# Patient Record
Sex: FEMALE | Race: WHITE | NOT HISPANIC OR LATINO | URBAN - METROPOLITAN AREA
[De-identification: names, ages, dates, MRNs, and addresses within clinical notes are randomized per-mention and may not be internally consistent; named-entity substitution may affect disease eponyms.]

---

## 2017-07-11 ENCOUNTER — EMERGENCY (EMERGENCY)
Facility: HOSPITAL | Age: 22
LOS: 1 days | Discharge: HOME | End: 2017-07-11
Attending: EMERGENCY MEDICINE | Admitting: INTERNAL MEDICINE
Payer: COMMERCIAL

## 2017-07-11 VITALS
DIASTOLIC BLOOD PRESSURE: 71 MMHG | HEIGHT: 63 IN | HEART RATE: 100 BPM | TEMPERATURE: 98 F | SYSTOLIC BLOOD PRESSURE: 106 MMHG | WEIGHT: 175.05 LBS | RESPIRATION RATE: 16 BRPM

## 2017-07-11 PROCEDURE — 99284 EMERGENCY DEPT VISIT MOD MDM: CPT

## 2017-07-11 RX ORDER — OXYCODONE AND ACETAMINOPHEN 5; 325 MG/1; MG/1
1 TABLET ORAL ONCE
Qty: 0 | Refills: 0 | Status: DISCONTINUED | OUTPATIENT
Start: 2017-07-11 | End: 2017-07-11

## 2017-07-11 RX ADMIN — OXYCODONE AND ACETAMINOPHEN 1 TABLET(S): 5; 325 TABLET ORAL at 19:49

## 2017-07-11 RX ADMIN — OXYCODONE AND ACETAMINOPHEN 1 TABLET(S): 5; 325 TABLET ORAL at 20:04

## 2017-07-11 RX ADMIN — Medication 300 MILLIGRAM(S): at 19:56

## 2017-07-24 ENCOUNTER — EMERGENCY (EMERGENCY)
Facility: HOSPITAL | Age: 22
LOS: 1 days | Discharge: HOME | End: 2017-07-24
Attending: EMERGENCY MEDICINE | Admitting: INTERNAL MEDICINE
Payer: COMMERCIAL

## 2017-07-24 VITALS
SYSTOLIC BLOOD PRESSURE: 110 MMHG | HEART RATE: 114 BPM | OXYGEN SATURATION: 97 % | TEMPERATURE: 99 F | HEIGHT: 63 IN | RESPIRATION RATE: 18 BRPM | DIASTOLIC BLOOD PRESSURE: 74 MMHG | WEIGHT: 175.05 LBS

## 2017-07-24 PROCEDURE — 99283 EMERGENCY DEPT VISIT LOW MDM: CPT

## 2017-07-24 RX ORDER — ALPRAZOLAM 0.25 MG
1 TABLET ORAL
Qty: 9 | Refills: 0 | OUTPATIENT
Start: 2017-07-24 | End: 2017-07-27

## 2017-07-24 RX ORDER — ALPRAZOLAM 0.25 MG
1 TABLET ORAL ONCE
Qty: 0 | Refills: 0 | Status: DISCONTINUED | OUTPATIENT
Start: 2017-07-24 | End: 2017-07-24

## 2017-07-24 RX ADMIN — Medication 1 MILLIGRAM(S): at 20:33

## 2017-07-24 NOTE — ED ADULT NURSE NOTE - OBJECTIVE STATEMENT
23YO female walked into ED, pt c/o "im having anxiety and some chest discomfort". pt denies any chest pain. pt indicates it all started since we became homeless.

## 2017-07-24 NOTE — ED ADULT TRIAGE NOTE - CHIEF COMPLAINT QUOTE
"My chest is hurting" X 2 days, had a rapid heartrate 2 days ago and was treated in another ER, ran out of Xanax, just came here from [place]

## 2017-07-24 NOTE — ED PROVIDER NOTE - OBJECTIVE STATEMENT
23 y/o F pt w/ PMHx of depression presents to the ED c/o chest discomfort x 4 days. Pt believes that the chest discomfort is anxiety induced, she has had episodes like this before... this symptom is typical for her anxiety exacerbations. Pt takes xanax and states that she ran out last night, which is when her symptoms began. Pt denies N/V or any other complaints at this time. 21 y/o F pt w/ PMHx of anxiety/depression presents to the ED c/o chest discomfort x 4 days. Pt believes that the chest discomfort is anxiety induced, she has had episodes like this before... this symptom is typical for her anxiety exacerbations. also c/o sob. Pt takes xanax and states that she ran out last night, which is when her symptoms began. pt recently moved here from [place], says very stressed, has not found a doctor yet. no si/hi/hallucinations. was seen at [place] yesterday - was given rx for xanax 0.25 and pt states was told to take 4 at a time to make up her dose, which gave her less than enough for 1 day. Pt says she was not given any numbers for psych referral. States did try today to make an appointment, got 1 a month from now. Pt denies N/V or any other complaints at this time.

## 2017-07-24 NOTE — ED PROVIDER NOTE - MEDICAL DECISION MAKING DETAILS
22 y.o. F with anxiety, recently moved, doesn't have regular doctor yet, takes xanax 1mg TID, having anxiety symptoms (CP, SOB), needs meds and referral

## 2017-07-26 ENCOUNTER — OUTPATIENT (OUTPATIENT)
Dept: OUTPATIENT SERVICES | Facility: HOSPITAL | Age: 22
LOS: 1 days | Discharge: STILL ADMITTED OR ADMINISTRATIVE | End: 2017-07-26

## 2017-07-27 DIAGNOSIS — F41.9 ANXIETY DISORDER, UNSPECIFIED: ICD-10-CM

## 2017-08-08 ENCOUNTER — EMERGENCY (EMERGENCY)
Facility: HOSPITAL | Age: 22
LOS: 1 days | Discharge: HOME | End: 2017-08-08
Attending: EMERGENCY MEDICINE | Admitting: EMERGENCY MEDICINE
Payer: COMMERCIAL

## 2017-08-08 VITALS
TEMPERATURE: 98 F | HEART RATE: 104 BPM | RESPIRATION RATE: 14 BRPM | HEIGHT: 63 IN | WEIGHT: 179.02 LBS | SYSTOLIC BLOOD PRESSURE: 95 MMHG | DIASTOLIC BLOOD PRESSURE: 61 MMHG | OXYGEN SATURATION: 95 %

## 2017-08-08 DIAGNOSIS — G43.909 MIGRAINE, UNSPECIFIED, NOT INTRACTABLE, WITHOUT STATUS MIGRAINOSUS: ICD-10-CM

## 2017-08-08 DIAGNOSIS — Z98.891 HISTORY OF UTERINE SCAR FROM PREVIOUS SURGERY: Chronic | ICD-10-CM

## 2017-08-08 DIAGNOSIS — F41.8 OTHER SPECIFIED ANXIETY DISORDERS: ICD-10-CM

## 2017-08-08 DIAGNOSIS — F17.210 NICOTINE DEPENDENCE, CIGARETTES, UNCOMPLICATED: ICD-10-CM

## 2017-08-08 DIAGNOSIS — R51 HEADACHE: ICD-10-CM

## 2017-08-08 DIAGNOSIS — Z88.0 ALLERGY STATUS TO PENICILLIN: ICD-10-CM

## 2017-08-08 DIAGNOSIS — R11.2 NAUSEA WITH VOMITING, UNSPECIFIED: ICD-10-CM

## 2017-08-08 LAB — HCG SERPL-ACNC: <1 MIU/ML — SIGNIFICANT CHANGE UP

## 2017-08-08 PROCEDURE — 99284 EMERGENCY DEPT VISIT MOD MDM: CPT

## 2017-08-08 RX ORDER — DIPHENHYDRAMINE HCL 50 MG
50 CAPSULE ORAL ONCE
Qty: 0 | Refills: 0 | Status: COMPLETED | OUTPATIENT
Start: 2017-08-08 | End: 2017-08-08

## 2017-08-08 RX ORDER — SODIUM CHLORIDE 9 MG/ML
1000 INJECTION INTRAMUSCULAR; INTRAVENOUS; SUBCUTANEOUS ONCE
Qty: 0 | Refills: 0 | Status: COMPLETED | OUTPATIENT
Start: 2017-08-08 | End: 2017-08-08

## 2017-08-08 RX ORDER — METOCLOPRAMIDE HCL 10 MG
10 TABLET ORAL ONCE
Qty: 0 | Refills: 0 | Status: COMPLETED | OUTPATIENT
Start: 2017-08-08 | End: 2017-08-08

## 2017-08-08 RX ORDER — SODIUM CHLORIDE 9 MG/ML
3 INJECTION INTRAMUSCULAR; INTRAVENOUS; SUBCUTANEOUS ONCE
Qty: 0 | Refills: 0 | Status: COMPLETED | OUTPATIENT
Start: 2017-08-08 | End: 2017-08-08

## 2017-08-08 RX ORDER — MAGNESIUM SULFATE 500 MG/ML
1 VIAL (ML) INJECTION ONCE
Qty: 0 | Refills: 0 | Status: COMPLETED | OUTPATIENT
Start: 2017-08-08 | End: 2017-08-08

## 2017-08-08 RX ADMIN — SODIUM CHLORIDE 3 MILLILITER(S): 9 INJECTION INTRAMUSCULAR; INTRAVENOUS; SUBCUTANEOUS at 21:38

## 2017-08-08 RX ADMIN — Medication 125 MILLIGRAM(S): at 22:56

## 2017-08-08 RX ADMIN — Medication 100 GRAM(S): at 21:32

## 2017-08-08 RX ADMIN — Medication 50 MILLIGRAM(S): at 21:31

## 2017-08-08 RX ADMIN — Medication 10 MILLIGRAM(S): at 21:32

## 2017-08-08 RX ADMIN — SODIUM CHLORIDE 1000 MILLILITER(S): 9 INJECTION INTRAMUSCULAR; INTRAVENOUS; SUBCUTANEOUS at 21:31

## 2017-08-08 NOTE — ED PROVIDER NOTE - CHPI ED SYMPTOMS NEG
no dizziness/no change in level of consciousness/no weakness/no numbness/no blurred vision/no fever/no confusion/no loss of consciousness

## 2017-08-08 NOTE — ED ADULT NURSE NOTE - CHPI ED SYMPTOMS NEG
no fever/no dizziness/no change in level of consciousness/no blurred vision/no numbness/no loss of consciousness

## 2017-08-08 NOTE — ED PROVIDER NOTE - PROGRESS NOTE DETAILS
pt reports resolution of headache at this time with no further symptoms.  Stable for discharge home at this time

## 2017-08-08 NOTE — ED ADULT NURSE NOTE - OBJECTIVE STATEMENT
Pt came to ED c/o headache for 5 days. Patient states she has a hx of headaches and is normally controlled on medication, but this time it was not helping. photosensitivity and nausea with two episiodes of vomitting noted. No visual disturbances, CP/SOB, abd pain, or fevers. VSS.

## 2017-08-08 NOTE — ED PROVIDER NOTE - PLAN OF CARE
Return to the ED for any new or worsening symptoms  Take your medication as prescribed previously  Advance activity as tolerated  Establish care with a PMD call the number provided tomorrow to schedule an appointment

## 2017-08-08 NOTE — ED PROVIDER NOTE - OBJECTIVE STATEMENT
Pt is a 21 yo female who presents to the ED with a cc of headache.  PMHx of migraines, depressions, anxiety.  Pt reports that for the last 5 days she has been experiencing a frontal HA.  This is similar to her previous headaches but her normal medication has not helped the symptoms.  She has tried Fioricet without relief of symptoms.  Reports associated phonophobia, photophobia, nausea, and 2 episodes of vomiting.  Denies fever, chills, D/C, neck pain, visual changes, CP, SOB, abd pain, ext numbness or weakness.  LMP 1 1/2 weeks ago.

## 2017-08-08 NOTE — ED PROVIDER NOTE - CARE PLAN
Principal Discharge DX:	Migraine  Instructions for follow-up, activity and diet:	Return to the ED for any new or worsening symptoms  Take your medication as prescribed previously  Advance activity as tolerated  Establish care with a PMD call the number provided tomorrow to schedule an appointment  Secondary Diagnosis:	Nausea & vomiting

## 2017-08-09 VITALS
SYSTOLIC BLOOD PRESSURE: 110 MMHG | RESPIRATION RATE: 14 BRPM | OXYGEN SATURATION: 98 % | TEMPERATURE: 98 F | DIASTOLIC BLOOD PRESSURE: 70 MMHG | HEART RATE: 89 BPM

## 2017-08-09 PROCEDURE — 96366 THER/PROPH/DIAG IV INF ADDON: CPT

## 2017-08-09 PROCEDURE — 99284 EMERGENCY DEPT VISIT MOD MDM: CPT | Mod: 25

## 2017-08-09 PROCEDURE — 36415 COLL VENOUS BLD VENIPUNCTURE: CPT

## 2017-08-09 PROCEDURE — 84702 CHORIONIC GONADOTROPIN TEST: CPT

## 2017-08-09 PROCEDURE — 96375 TX/PRO/DX INJ NEW DRUG ADDON: CPT

## 2017-08-09 PROCEDURE — 96365 THER/PROPH/DIAG IV INF INIT: CPT

## 2017-08-09 RX ORDER — MAGNESIUM SULFATE 500 MG/ML
1 VIAL (ML) INJECTION ONCE
Qty: 0 | Refills: 0 | Status: COMPLETED | OUTPATIENT
Start: 2017-08-09 | End: 2017-08-09

## 2017-08-09 RX ADMIN — Medication 100 GRAM(S): at 00:51

## 2017-08-31 ENCOUNTER — EMERGENCY (EMERGENCY)
Facility: HOSPITAL | Age: 22
LOS: 1 days | Discharge: HOME | End: 2017-08-31
Attending: EMERGENCY MEDICINE | Admitting: EMERGENCY MEDICINE
Payer: COMMERCIAL

## 2017-08-31 VITALS
TEMPERATURE: 98 F | HEART RATE: 110 BPM | OXYGEN SATURATION: 100 % | SYSTOLIC BLOOD PRESSURE: 111 MMHG | RESPIRATION RATE: 16 BRPM | WEIGHT: 179.9 LBS | DIASTOLIC BLOOD PRESSURE: 78 MMHG | HEIGHT: 62 IN

## 2017-08-31 DIAGNOSIS — K08.89 OTHER SPECIFIED DISORDERS OF TEETH AND SUPPORTING STRUCTURES: ICD-10-CM

## 2017-08-31 DIAGNOSIS — Z88.5 ALLERGY STATUS TO NARCOTIC AGENT: ICD-10-CM

## 2017-08-31 DIAGNOSIS — Z88.0 ALLERGY STATUS TO PENICILLIN: ICD-10-CM

## 2017-08-31 DIAGNOSIS — Z98.891 HISTORY OF UTERINE SCAR FROM PREVIOUS SURGERY: Chronic | ICD-10-CM

## 2017-08-31 PROCEDURE — 99283 EMERGENCY DEPT VISIT LOW MDM: CPT

## 2017-08-31 RX ORDER — CITALOPRAM 10 MG/1
1 TABLET, FILM COATED ORAL
Qty: 0 | Refills: 0 | COMMUNITY

## 2017-09-02 ENCOUNTER — EMERGENCY (EMERGENCY)
Facility: HOSPITAL | Age: 22
LOS: 1 days | Discharge: HOME | End: 2017-09-02
Attending: EMERGENCY MEDICINE | Admitting: EMERGENCY MEDICINE
Payer: COMMERCIAL

## 2017-09-02 VITALS
TEMPERATURE: 98 F | HEART RATE: 100 BPM | SYSTOLIC BLOOD PRESSURE: 107 MMHG | RESPIRATION RATE: 14 BRPM | WEIGHT: 179.9 LBS | DIASTOLIC BLOOD PRESSURE: 70 MMHG | OXYGEN SATURATION: 99 %

## 2017-09-02 DIAGNOSIS — Z88.5 ALLERGY STATUS TO NARCOTIC AGENT: ICD-10-CM

## 2017-09-02 DIAGNOSIS — F41.8 OTHER SPECIFIED ANXIETY DISORDERS: ICD-10-CM

## 2017-09-02 DIAGNOSIS — F17.200 NICOTINE DEPENDENCE, UNSPECIFIED, UNCOMPLICATED: ICD-10-CM

## 2017-09-02 DIAGNOSIS — Z98.891 HISTORY OF UTERINE SCAR FROM PREVIOUS SURGERY: Chronic | ICD-10-CM

## 2017-09-02 DIAGNOSIS — Z88.0 ALLERGY STATUS TO PENICILLIN: ICD-10-CM

## 2017-09-02 DIAGNOSIS — K08.89 OTHER SPECIFIED DISORDERS OF TEETH AND SUPPORTING STRUCTURES: ICD-10-CM

## 2017-09-02 PROCEDURE — 99283 EMERGENCY DEPT VISIT LOW MDM: CPT

## 2017-09-02 PROCEDURE — 99282 EMERGENCY DEPT VISIT SF MDM: CPT

## 2017-09-02 NOTE — ED ADULT NURSE NOTE - OBJECTIVE STATEMENT
Patient c/o left lower tooth pain in the molar area.  She was seen here for the same yesterday, and was given a scrip for percocet.  She did not see a dentist today.

## 2017-09-02 NOTE — ED PROVIDER NOTE - PROGRESS NOTE DETAILS
offered ibuprofen - patient refused   offered antibiotics - patient refused  discussed importance of follow up with dentist

## 2017-09-02 NOTE — ED PROVIDER NOTE - OBJECTIVE STATEMENT
23 yo female c/o toothache. Patient was seen in ED x 2 days ago, and states that percocet doesn't help. took motrin earlier today. denies any fever or chills. denies any difficulty swallowing.

## 2017-09-04 ENCOUNTER — EMERGENCY (EMERGENCY)
Facility: HOSPITAL | Age: 22
LOS: 1 days | Discharge: HOME | End: 2017-09-04
Attending: EMERGENCY MEDICINE | Admitting: EMERGENCY MEDICINE
Payer: COMMERCIAL

## 2017-09-04 VITALS
OXYGEN SATURATION: 99 % | DIASTOLIC BLOOD PRESSURE: 60 MMHG | SYSTOLIC BLOOD PRESSURE: 100 MMHG | RESPIRATION RATE: 16 BRPM | TEMPERATURE: 98 F | HEART RATE: 95 BPM

## 2017-09-04 VITALS
TEMPERATURE: 98 F | SYSTOLIC BLOOD PRESSURE: 119 MMHG | OXYGEN SATURATION: 100 % | DIASTOLIC BLOOD PRESSURE: 80 MMHG | HEIGHT: 63 IN | WEIGHT: 179.9 LBS | RESPIRATION RATE: 16 BRPM | HEART RATE: 103 BPM

## 2017-09-04 DIAGNOSIS — Z98.891 HISTORY OF UTERINE SCAR FROM PREVIOUS SURGERY: Chronic | ICD-10-CM

## 2017-09-04 PROCEDURE — 99283 EMERGENCY DEPT VISIT LOW MDM: CPT

## 2017-09-04 RX ORDER — PANTOPRAZOLE SODIUM 20 MG/1
40 TABLET, DELAYED RELEASE ORAL ONCE
Qty: 0 | Refills: 0 | Status: COMPLETED | OUTPATIENT
Start: 2017-09-04 | End: 2017-09-04

## 2017-09-04 RX ADMIN — Medication 30 MILLILITER(S): at 21:03

## 2017-09-04 RX ADMIN — PANTOPRAZOLE SODIUM 40 MILLIGRAM(S): 20 TABLET, DELAYED RELEASE ORAL at 20:59

## 2017-09-04 NOTE — ED ADULT NURSE REASSESSMENT NOTE - NS ED NURSE REASSESS COMMENT FT1
patient DC home awake, alert oriented ambulatory stated she felt better; VS WNL; dc instructions given; verbalized understanding.

## 2017-09-04 NOTE — ED PROVIDER NOTE - CHPI ED SYMPTOMS NEG
no diarrhea/no fever/no hematuria/no dysuria/no chills/no vomiting/no nausea/no blood in stool/no burning urination/no abdominal distension

## 2017-09-04 NOTE — ED PROVIDER NOTE - OBJECTIVE STATEMENT
Pt is a 21 yo f who has had multiple visits to this and other emergency departments for dental pain and migrane ha.  she reports an allergy to toradol but has been taking naproxen for the dental pain because the er doctor told her "take it every time you have pain" rather than take it as prescribed on the pill bottle she had been taking every 2-4 hours in past 24 hours and states she now has abdominal pain in her stomach. no n v no fevr  no chlls. she denies any other complaint.  she states she cant get to an er with a dental chair because she has to pay for transport and lives 5 min away from this hospital by car where she relies on friends to take her. (but could not explain why they would not take her the extra few minutes for definitive care) she also states she has a dentist appointment scheduled on next monday (9/11) but was uncertain of the date.  Pt states as an infant she had ureter surgery. she is currently taking amoxil but states she is allergic to pcn. taking naproxen but has allergy to toradol?

## 2017-09-04 NOTE — ED PROVIDER NOTE - CONSTITUTIONAL, MLM
normal... Well appearing, well nourished, awake, alert, oriented to person, place, time/situation and in no apparent distress. sitting on exam bed no distress, comfortable watching tv

## 2017-09-04 NOTE — ED PROVIDER NOTE - GASTROINTESTINAL, MLM
Abdomen soft, non-tender, no guarding. no rebound no cvat normal bowel sounds. pt complained when I listened with sthescope over stomach but not to deep palp sitting up and deep breathing, laying flat no difficulty in movement

## 2017-09-04 NOTE — ED PROVIDER NOTE - ENMT, MLM
Airway patent, Nasal mucosa clear. Mouth with normal mucosa. Throat has no vesicles, no oropharyngeal exudates and uvula is midline. poor dentition, there is a film over all teeth not well brushed, impacted bl lower wisdom teeth no dental pain to percussion no visible abscess no facial swelling

## 2017-09-04 NOTE — ED PROVIDER NOTE - PROGRESS NOTE DETAILS
heather reviewed multiple visits to this er and [facility] for dental pain depression  nys  scattered rx for percocet no regular rx pt had not yet gotten her meds. but was still very comfortable given warm blankets by primary rn. then meds given.

## 2017-09-08 DIAGNOSIS — F41.8 OTHER SPECIFIED ANXIETY DISORDERS: ICD-10-CM

## 2017-09-08 DIAGNOSIS — K29.70 GASTRITIS, UNSPECIFIED, WITHOUT BLEEDING: ICD-10-CM

## 2017-09-08 DIAGNOSIS — R10.9 UNSPECIFIED ABDOMINAL PAIN: ICD-10-CM

## 2017-09-08 DIAGNOSIS — F17.200 NICOTINE DEPENDENCE, UNSPECIFIED, UNCOMPLICATED: ICD-10-CM

## 2018-10-08 NOTE — ED PROVIDER NOTE - OBJECTIVE STATEMENT
Patient presents to ER via [facility] from [facility] for fall. Denies  LOC, C/O headache posterior head pain where she states she hit her head.
21yo female who presents with toothache for 4 days. pt states one of her filling cracked and since then she has been having nerve pain, shooting and radiating to cheek, no fever, chills, no pus or drainage. pt has been taking motrin and tylenol with no rleief

## 2021-08-04 NOTE — ED ADULT TRIAGE NOTE - NSWEIGHTCALCTOOLDRUG_GEN_A_CORE
used
I personally performed the service described in the documentation recorded by the scribe in my presence, and it accurately and completely records my words and actions.

## 2023-04-18 NOTE — ED ADULT NURSE NOTE - RN DISCHARGE SIGNATURE
Xerosis Aggressive Treatment: I recommended application of Cetaphil or CeraVe numerous times a day and before going to bed to all dry areas. I also prescribed a topical steroid for twice daily use. 02-Sep-2017

## 2024-01-08 NOTE — ED ADULT NURSE NOTE - DISCHARGE DATE/TIME
Medications  Medications reviewed with patient/caregiver?: Yes (1/8/2024 10:28 AM)  Is the patient having any side effects they believe may be caused by any medication additions or changes?: No (1/8/2024 10:28 AM)  Does the patient have all medications ordered at discharge?: Yes (1/8/2024 10:28 AM)  Care Management Interventions: No intervention needed (1/8/2024 10:28 AM)  Is the patient taking all medications as directed (includes completed medication regime)?: Yes (1/8/2024 10:28 AM)    Appointments  Does the patient have a primary care provider?: Yes (Following up with OB/GYN) (1/8/2024 10:28 AM)  Care Management Interventions: Verified appointment date/time/provider (1/8/2024 10:28 AM)  Has the patient kept scheduled appointments due by today?: Yes (1/8/2024 10:28 AM)  Care Management Interventions: Advised patient to keep appointment (1/8/2024 10:28 AM)    Patient Teaching  Does the patient have access to their discharge instructions?: Yes (1/8/2024 10:28 AM)  Care Management Interventions: Reviewed instructions with patient (1/8/2024 10:28 AM)  What is the patient's perception of their health status since discharge?: Improving (1/8/2024 10:28 AM)    Wrap Up  Wrap Up Additional Comments: Alba reports she is OK, just nauseous today.  Baby is doing well.  Advised her to try taking the zofran prescibed and to stay hydrated.  Alba has a f/u appt today with her OB (1/8/2024 10:28 AM)        
08-Aug-2017 06:36

## 2024-09-03 NOTE — ED ADULT NURSE NOTE - OBJECTIVE STATEMENT
c/o tooth pain on LL back of mouth. NO acute distress noted. MSpencer
Pt presents to ED c/o CP, nausea, vomiting, diarrhea, weakness x1 day. Patient has history of THC use. Denies any fever chills sob alcohol or other drug use

## 2024-09-03 NOTE — ED ADULT NURSE NOTE - CHIEF COMPLAINT
The patient is a 22y Female complaining of toothache.
The patient is a 35y Female complaining of chest pain.

## 2024-09-03 NOTE — ED PROVIDER NOTE - OBJECTIVE STATEMENT
23 yo white female with left lower toothache x few days, scheduled to be seen by dentist next week and now here for evaluation. No fever or chills
35-year-old female with history of asthma, appendectomy, regular THC smoking, presents with mother with abdominal pain, vomiting, diarrhea. Reports multiple episodes of nonbloody yellow-colored emesis as well as multiple episodes of nonbloody diarrhea since yesterday. Associated periumbilical nonradiating abdominal cramping. Denies all other symptoms, however noted triage report of chest pain, when asked about this states she has had chronic left-sided chest pain radiating to the left arm for years, unchanged today. Regular THC use. Patient frequently closes her eyes and stops talking in sentences, difficulty fully obtaining history. Denies fever, urinary symptoms, shortness of breath. Denies regular alcohol or other drug use.